# Patient Record
Sex: FEMALE | Race: WHITE | NOT HISPANIC OR LATINO | Employment: OTHER | URBAN - METROPOLITAN AREA
[De-identification: names, ages, dates, MRNs, and addresses within clinical notes are randomized per-mention and may not be internally consistent; named-entity substitution may affect disease eponyms.]

---

## 2021-03-03 DIAGNOSIS — Z23 NEED FOR VACCINATION: ICD-10-CM

## 2022-08-27 ENCOUNTER — OFFICE VISIT (OUTPATIENT)
Dept: URGENT CARE | Facility: CLINIC | Age: 72
End: 2022-08-27
Payer: MEDICARE

## 2022-08-27 VITALS
SYSTOLIC BLOOD PRESSURE: 126 MMHG | HEART RATE: 109 BPM | HEIGHT: 65 IN | OXYGEN SATURATION: 95 % | BODY MASS INDEX: 19.33 KG/M2 | TEMPERATURE: 99.1 F | DIASTOLIC BLOOD PRESSURE: 62 MMHG | RESPIRATION RATE: 20 BRPM | WEIGHT: 116 LBS

## 2022-08-27 DIAGNOSIS — J44.1 ACUTE EXACERBATION OF CHRONIC OBSTRUCTIVE PULMONARY DISEASE (COPD) (HCC): ICD-10-CM

## 2022-08-27 PROBLEM — G25.0 ESSENTIAL TREMOR: Status: ACTIVE | Noted: 2022-08-27

## 2022-08-27 PROBLEM — I49.9 CARDIAC ARRHYTHMIA: Status: ACTIVE | Noted: 2022-08-27

## 2022-08-27 PROBLEM — M25.519 SHOULDER JOINT PAIN: Status: ACTIVE | Noted: 2022-08-27

## 2022-08-27 PROBLEM — F41.9 ANXIETY: Status: ACTIVE | Noted: 2022-08-27

## 2022-08-27 PROBLEM — J44.9 CHRONIC OBSTRUCTIVE PULMONARY DISEASE (HCC): Status: ACTIVE | Noted: 2019-06-20

## 2022-08-27 PROBLEM — D75.89 MACROCYTOSIS: Status: ACTIVE | Noted: 2022-08-27

## 2022-08-27 PROBLEM — Q79.9: Status: ACTIVE | Noted: 2022-08-27

## 2022-08-27 PROBLEM — J96.11 CHRONIC RESPIRATORY FAILURE WITH HYPOXIA (HCC): Status: ACTIVE | Noted: 2019-06-20

## 2022-08-27 PROBLEM — R63.4 WEIGHT LOSS: Status: ACTIVE | Noted: 2019-06-20

## 2022-08-27 PROBLEM — U07.1 COVID-19 VIRUS DETECTED: Status: ACTIVE | Noted: 2022-08-21

## 2022-08-27 PROCEDURE — 94640 AIRWAY INHALATION TREATMENT: CPT | Performed by: NURSE PRACTITIONER

## 2022-08-27 PROCEDURE — 99204 OFFICE O/P NEW MOD 45 MIN: CPT | Mod: 25 | Performed by: NURSE PRACTITIONER

## 2022-08-27 RX ORDER — ACLIDINIUM BROMIDE 400 UG/1
POWDER, METERED RESPIRATORY (INHALATION)
COMMUNITY

## 2022-08-27 RX ORDER — DOXYCYCLINE HYCLATE 100 MG
100 TABLET ORAL 2 TIMES DAILY
Qty: 14 TABLET | Refills: 0 | Status: SHIPPED | OUTPATIENT
Start: 2022-08-27 | End: 2022-09-03

## 2022-08-27 RX ORDER — DEXAMETHASONE 4 MG/1
TABLET ORAL
COMMUNITY
Start: 2022-08-22

## 2022-08-27 RX ORDER — DEXAMETHASONE 4 MG/1
6 TABLET ORAL DAILY
COMMUNITY
Start: 2022-08-22 | End: 2022-08-27

## 2022-08-27 RX ORDER — NICOTINE 21 MG/24HR
1 PATCH, TRANSDERMAL 24 HOURS TRANSDERMAL
COMMUNITY

## 2022-08-27 RX ORDER — PREDNISONE 10 MG/1
20 TABLET ORAL 2 TIMES DAILY
Qty: 20 TABLET | Refills: 0 | Status: SHIPPED | OUTPATIENT
Start: 2022-08-27 | End: 2022-08-27 | Stop reason: SDUPTHER

## 2022-08-27 RX ORDER — PREDNISONE 10 MG/1
20 TABLET ORAL 2 TIMES DAILY
Qty: 20 TABLET | Refills: 0 | Status: SHIPPED | OUTPATIENT
Start: 2022-08-27 | End: 2022-09-01

## 2022-08-27 RX ORDER — FLUTICASONE PROPIONATE AND SALMETEROL 250; 50 UG/1; UG/1
POWDER RESPIRATORY (INHALATION)
COMMUNITY
Start: 2022-05-24

## 2022-08-27 RX ORDER — ALBUTEROL SULFATE 2.5 MG/3ML
SOLUTION RESPIRATORY (INHALATION)
COMMUNITY

## 2022-08-27 RX ORDER — DOXYCYCLINE HYCLATE 100 MG
100 TABLET ORAL 2 TIMES DAILY
Qty: 14 TABLET | Refills: 0 | Status: SHIPPED | OUTPATIENT
Start: 2022-08-27 | End: 2022-08-27 | Stop reason: SDUPTHER

## 2022-08-27 RX ORDER — ALBUTEROL SULFATE 90 UG/1
AEROSOL, METERED RESPIRATORY (INHALATION)
COMMUNITY
Start: 2022-07-26

## 2022-08-27 RX ORDER — GINSENG 100 MG
CAPSULE ORAL
COMMUNITY

## 2022-08-27 RX ORDER — MONTELUKAST SODIUM 10 MG/1
TABLET ORAL
COMMUNITY
Start: 2022-07-16

## 2022-08-27 RX ORDER — GUAIFENESIN 1200 MG/1
TABLET, EXTENDED RELEASE ORAL
COMMUNITY

## 2022-08-27 RX ORDER — IPRATROPIUM BROMIDE AND ALBUTEROL SULFATE 2.5; .5 MG/3ML; MG/3ML
3 SOLUTION RESPIRATORY (INHALATION) ONCE
Status: COMPLETED | OUTPATIENT
Start: 2022-08-27 | End: 2022-08-27

## 2022-08-27 RX ADMIN — IPRATROPIUM BROMIDE AND ALBUTEROL SULFATE 3 ML: 2.5; .5 SOLUTION RESPIRATORY (INHALATION) at 12:28

## 2022-08-27 NOTE — PROGRESS NOTES
Chief Complaint   Patient presents with    Cough     Pt has a cough, weakness x last night        HISTORY OF PRESENT ILLNESS: Patient is a pleasant 72 y.o. female with a history of COPD, oxygen dependence, and recent COVID infection who presents to urgent care today with an increase in cough and chest congestion since yesterday.  Patient endorses associated nasal congestion as well.  She was diagnosed with COVID approximately 1 week ago.  She received monoclonal antibodies and dexamethasone in an outlying emergency department.  She was feeling improvement but then felt an increase in cough and chest congestion last night.  Her symptoms feel similar to previous COPD exacerbations.  She denies any fever, chest pain, or other physical complaints.  The patient is visiting from [place], she is here today with her .  At home, the patient typically responds well to COPD exacerbations with a steroid and and an antibiotic.  She is currently on O2 continuously via nasal cannula and has increased to 4 L due to ER physician's advice due to patient's increase in altitude and recent COVID infection.  Patient has been having a difficult time with her concentrator, with batteries running out quickly and needing recharged often, and is attempting to have a new device delivered to her tomorrow morning before she flies home tomorrow afternoon.    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    Essential tremor 08/27/2022    Macrocytosis 08/27/2022    Shoulder joint pain 08/27/2022    COVID-19 virus detected 08/21/2022    Chronic obstructive pulmonary disease (HCC) 06/20/2019    Chronic respiratory failure with hypoxia (HCC) 06/20/2019    Weight loss 06/20/2019       Allergies:Alkylamines, Arformoterol, Ceftriaxone, Levofloxacin, Moxifloxacin, and Levaquin    Current Outpatient Medications Ordered in Epic    Medication Sig Dispense Refill    Zinc 50 MG Tab take 1 tablet by oral route daily      sertraline (ZOLOFT) 50 MG Tab take 1 tablet (50 mg) by oral route once daily      nicotine (NICODERM) 14 MG/24HR PATCH 24 HR Place 1 Patch on the skin.      montelukast (SINGULAIR) 10 MG Tab       montelukast (SINGULAIR) 10 MG Tab take 1 tablet by oral route daily      Guaifenesin (MUCINEX MAXIMUM STRENGTH) 1200 MG TABLET SR 12 HR prn      fluticasone-salmeterol (ADVAIR) 250-50 MCG/ACT AEROSOL POWDER, BREATH ACTIVATED inhale 1 puff by inhalation route 2 times per day in the morning and evening approximately 12 hours apart      dexamethasone (DECADRON) 4 MG Tab Take 1.5 Tablets (6 mg) by mouth daily for 5 days.      dexamethasone (DECADRON) 4 MG Tab Take 6 mg by mouth every day.      albuterol 108 (90 Base) MCG/ACT Aero Soln inhalation aerosol inhale 2 puffs (180 mcg) by inhalation route every 6 hours as needed      albuterol (PROVENTIL) 2.5mg/3ml Nebu Soln solution for nebulization inhale 3 milliliters (2.5 mg) by nebulization route every 6 hours as needed      albuterol 108 (90 Base) MCG/ACT Aero Soln inhalation aerosol INHALE 1 TO 2 PUFFS EVERY 4 TO 6 HOURS AS NEEDED      Aclidinium Bromide (TUDORZA PRESSAIR) 400 MCG/ACT AEROSOL POWDER, BREATH ACTIVATED Inhale.      doxycycline (VIBRAMYCIN) 100 MG Tab Take 1 Tablet by mouth 2 times a day for 7 days. 14 Tablet 0    predniSONE (DELTASONE) 10 MG Tab Take 2 Tablets by mouth 2 times a day for 5 days. Take with food. 20 Tablet 0     No current Epic-ordered facility-administered medications on file.       History reviewed. No pertinent past medical history.    Social History     Tobacco Use    Smoking status: Never    Smokeless tobacco: Never   Vaping Use    Vaping Use: Never used   Substance Use Topics    Alcohol use: Yes    Drug use: Not Currently       No family status information on file.   History reviewed. No pertinent family history.    ROS:  Review of Systems  "  Constitutional: Negative for fever, chills, weight loss, malaise, and fatigue.   HENT: Positive for congestion.  Negative for ear pain, nosebleeds, sore throat and neck pain.    Eyes: Negative for vision changes.   Neuro: Negative for headache, sensory changes, weakness, seizure, LOC.   Cardiovascular: Negative for chest pain, palpitations, orthopnea and leg swelling.   Respiratory: Positive for cough, sputum production, shortness of breath and wheezing.   Gastrointestinal: Negative for abdominal pain, nausea, vomiting or diarrhea.   Genitourinary: Negative for dysuria, urgency and frequency.  Musculoskeletal: Negative for falls, neck pain, back pain, joint pain, myalgias.   Skin: Negative for rash, diaphoresis.     Exam:  /62 (BP Location: Left arm, Patient Position: Sitting, BP Cuff Size: Small adult)   Pulse (!) 109   Temp 37.3 °C (99.1 °F) (Temporal)   Resp 20   Ht 1.651 m (5' 5\")   Wt 52.6 kg (116 lb)   SpO2 95%   General: well-nourished, well-developed female in NAD  Head: normocephalic, atraumatic  Eyes: PERRLA, no conjunctival injection, acuity grossly intact, lids normal.  Ears: normal shape and symmetry, no tenderness, no discharge. External canals are without any significant edema or erythema. Tympanic membranes are without any inflammation, no effusion. Gross auditory acuity is intact.  Nose: symmetrical without tenderness, no discharge.  Mouth/Throat: reasonable hygiene, no erythema, exudates or tonsillar enlargement.  Neck: no masses, range of motion within normal limits, no tracheal deviation. No obvious thyroid enlargement.   Lymph: no cervical adenopathy. No supraclavicular adenopathy.   Neuro: alert and oriented. Cranial nerves 1-12 grossly intact. No sensory deficit.   Cardiovascular: regular rate and rhythm. No edema.  Pulmonary: no distress. Chest is symmetrical with respiration.  Diminished throughout with expiratory wheezes and rhonchi.  Musculoskeletal: no clubbing, appropriate " muscle tone, gait is stable.  Skin: warm, dry, intact, no clubbing, no cyanosis, no rashes.   Psych: appropriate mood, affect, judgement.         Assessment/Plan:  1. Acute exacerbation of chronic obstructive pulmonary disease (COPD) (HCC)  ipratropium-albuterol (DUONEB) nebulizer solution    doxycycline (VIBRAMYCIN) 100 MG Tab    predniSONE (DELTASONE) 10 MG Tab    DISCONTINUED: doxycycline (VIBRAMYCIN) 100 MG Tab    DISCONTINUED: predniSONE (DELTASONE) 10 MG Tab          The patient is a pleasant 72-year-old female with a history of COPD and recent COVID infection who presents with increasing cough and chest congestion since yesterday.  Upon examination the patient does have wheezes and rhonchi.  Unfortunately there is no x-ray available in this clinic today.  I have offered the patient a chest x-ray at an outlying facility, she has politely declined.  She states that her symptoms today feel exactly similar to previous COPD exacerbations which responded well with oral antibiotic and steroid therapy.  The patient is given a DuoNeb in clinic upon arrival with improvement of symptoms, oxygen maintains at 95%.  Patient will be given doxycycline and prednisone to start today.  She may use her personal albuterol inhaler as well as needed.  She is instructed to keep a close watch on her oxygen concentrator to make sure that she is continuing to receive oxygen, instructed to follow-up with DME company to have new device delivered tomorrow morning.  Strict ER precautions in the meantime.  Supportive care, differential diagnoses, and indications for immediate follow-up discussed with patient.   Pathogenesis of diagnosis discussed including typical length and natural progression.   Instructed to return to clinic or nearest emergency department for any change in condition, further concerns, or worsening of symptoms.  Patient states understanding of the plan of care and discharge instructions.  Instructed to make an  appointment, for follow up, with her primary care provider.        Please note that this dictation was created using voice recognition software. I have made every reasonable attempt to correct obvious errors, but I expect that there are errors of grammar and possibly content that I did not discover before finalizing the note. Previous ED visit encounter reviewed and considered in medical decision making today. I spent a total of 45 minutes with record review, exam, communication with the patient, and documentation of this encounter.          JAX Tucker.